# Patient Record
(demographics unavailable — no encounter records)

---

## 2024-12-26 NOTE — HISTORY OF PRESENT ILLNESS
[de-identified] : Mr. Deny Dasilva is a 68 year old male here today for evaluation and management of Prostate Cancer.  \par  \par  He was referred by [facility], Dr. Lr.  Deny is a 68 year old M with PMHx including HTN, who presents to clinic to establish care, accompanied by daughter at initial visit.   Patient underwent TRUS guided prostate biopsy on 2/1/2023 which revealed adenocarcinoma of prostate.  He underwent PET/CT PSMA which reveals numerous foci of abnormal accumulation within the osseous structures of head and neck, thorax, abdomen and pelvis with numerous para-aortic and bilateral iliac lymph nodes, multiple osseous lesions in the left acetabulum, pubic rami T3 and 8 vertebral body.  [place] [facility] [place] and recommended to continue with ADT + Xtandi or Zytiga and repeat imaging in 6 months.  He was started on casodex and ADT (Eliguard every 3 months) which was initiated in 03/2023.  Subsequently, he was started on Xtandi 160mg daily and tolerating fine with some fatigue.  He is presently feeling well.  Patient denies fever, chills, nausea, vomiting, dyspnea, new bony pain or bleeding.  He endorses weight loss of about 30lbs in the last year or so.  Patient reports no known family history of malignancy.\par  \par  RADIOLOGIC WORKUP\par  PET/CT PSMA (3.3.2023) IMPRESSION:Numerous foci of abnormal 18F PSMA accumulation within osseous structures (listed above), mediastinum, abdominal lymph nodes, retrocrural lymph node, prostate gland and left lower neck, consistent with metastatic disease.\par  NM Bone Imaging (1.20.2023) Impression:Abnormal bone agent uptake in the region of the right and left posterior acetabulum seen on planar and SPECT imaging which in correlation with CT abdomen and pelvis 12/28/2022 suspicious for metastatic bone disease.Additional focus of abnormal bone agent uptake in the right anterior second rib which is indeterminant. Correlation with CT chest may be obtained\par  CT A/P (12.28.2022) IMPRESSION:A few scattered sclerotic lesions in the pelvis, for example 7 mm sclerotic lesion in the right ischial tuberosity with lucent center  (8/303), 1.1 cm targetoid lesion in the right iliac bone (8/204) and 1.1 cm sclerotic lesion in the posterior left acetabulum (8/260). These lesions are nonspecific but worrisome for metastatic bone disease if a prostate carcinoma diagnosis is made. Recommend further evaluation with bone scan.No additional evidence for metastatic disease is identified.\par  MR Prostate (12.1.2022-R) IMPRESSION:  1.  Diffuse prostate cancer throughout the entire peripheral zone with involvement of the left transition zone at the apex in the right transition zone at the base.  Mild bulging of the anatomic capsule of the right peripheral zone at the level of the mid gland suspicious for extracapsular extension.  Key images available.  PI-RADS category 5 very high (clinically significant cancer is highly likely to be present). 2.  Suspicious lesion in the right posterior acetabulum.  Recommend correlation with bone scan.3.  No seminal vesicle invasion or pelvic lymphadenopathy.\par  \par  LAB WORKUP \par  (4.20.2023) PSA 5.95 \par  (11.9.2022) PSA 30.70\par  (7.28.2022) PSA 24.42\par  \par  PATHOLOGY\par  Final Diagnosis (2.1.2023) Prostatic Adenocarcinoma.  Dylon's Grade 4+3 = 7/10 (see scanned files) \par  \par  HCM\par  Colonoscopy never done [de-identified] : 8/11/23 Patient is here for a follow-up visit for Prostate Cancer.  He is feeling well with no new complaints.  He continues on Xtandi 140mg daily.  His last Eliguard was administered last week with URO.  Reviewed most recent CBC, which is stable with hgb 14.2g/dL.  PSA level came down to 0.31ng/mL.  He has CT Chest pending since he is a current, every day smoker.  Encouraged to completely abstain from smoking.  We offered smoking cessation  referral, but patient deferred at this time.  Patient aware to let us know when he is ready to quit and we will be glad to assist. He endorses occasional hot flashes.    11/3/23 Patient is here for a follow-up visit for Prostate Cancer.  He is feeling well with no new complaints.  He continues on Xtandi 140mg daily.  His last Eliguard was administered last week with URO.  Reviewed most recent CBC, which is stable with hgb 14g/dL.  PSA level came down to 0.15ng/mL.  He says he had CT Chest performed recently since he is a current, every day smoker.  He does not have the results for our review.  Encouraged to completely abstain from smoking.    1/26/24 Patient is here for a follow-up visit for Prostate Cancer.  He is feeling well with no new complaints.  He continues on Xtandi 140mg daily.  His last Eliguard was administered last week with URO.  Reviewed most recent CBC, which is stable with hgb 13.8g/dL.  PSA level came down to 0.08ng/mL.  Encouraged to completely abstain from smoking.    7/11/24 Patient is here for a follow-up visit for Prostate Cancer.  He is feeling well with no new complaints.  He continues on Xtandi 140mg daily.  His last Eliguard was administered last week with URO.  Reviewed most recent CBC, which is stable with hgb 13.3g/dL.  PSA level came down to 0.06ng/mL.  Encouraged to completely abstain from smoking.    10/3/24 Patient is here for a follow-up visit for Prostate Cancer.  He is feeling well with no new complaints.  He continues on Xtandi 140mg daily.  His is due for next Eliguard with URO in 2 weeks.  Reviewed most recent CBC, which is stable with hgb 14.5g/dL.  PSA level down to 0.05ng/mL.  Encouraged to completely abstain from smoking.  His potassium is high, but he says he has been having a lot of bananas recently.  He endorses occasional hot flashes and some fatigue.  He denies fevers, chills, chest pain, palpitations, nausea, vomiting, bony pain or hematuria.  He continues to smoke.   12/26/24

## 2024-12-26 NOTE — HISTORY OF PRESENT ILLNESS
[de-identified] : Mr. Deny Dasilva is a 68 year old male here today for evaluation and management of Prostate Cancer.  \par  \par  He was referred by [facility], Dr. Lr.  Deny is a 68 year old M with PMHx including HTN, who presents to clinic to establish care, accompanied by daughter at initial visit.   Patient underwent TRUS guided prostate biopsy on 2/1/2023 which revealed adenocarcinoma of prostate.  He underwent PET/CT PSMA which reveals numerous foci of abnormal accumulation within the osseous structures of head and neck, thorax, abdomen and pelvis with numerous para-aortic and bilateral iliac lymph nodes, multiple osseous lesions in the left acetabulum, pubic rami T3 and 8 vertebral body.  Patient was [place] [facility] [place]. Youssef) and recommended to continue with ADT + Xtandi or Zytiga and repeat imaging in 6 months.  He was started on casodex and ADT (Eliguard every 3 months) which was initiated in 03/2023.  Subsequently, he was started on Xtandi 160mg daily and tolerating fine with some fatigue.  He is presently feeling well.  Patient denies fever, chills, nausea, vomiting, dyspnea, new bony pain or bleeding.  He endorses weight loss of about 30lbs in the last year or so.  Patient reports no known family history of malignancy.\par  \par  RADIOLOGIC WORKUP\par  PET/CT PSMA (3.3.2023) IMPRESSION:Numerous foci of abnormal 18F PSMA accumulation within osseous structures (listed above), mediastinum, abdominal lymph nodes, retrocrural lymph node, prostate gland and left lower neck, consistent with metastatic disease.\par  NM Bone Imaging (1.20.2023) Impression:Abnormal bone agent uptake in the region of the right and left posterior acetabulum seen on planar and SPECT imaging which in correlation with CT abdomen and pelvis 12/28/2022 suspicious for metastatic bone disease.Additional focus of abnormal bone agent uptake in the right anterior second rib which is indeterminant. Correlation with CT chest may be obtained\par  CT A/P (12.28.2022) IMPRESSION:A few scattered sclerotic lesions in the pelvis, for example 7 mm sclerotic lesion in the right ischial tuberosity with lucent center  (8/303), 1.1 cm targetoid lesion in the right iliac bone (8/204) and 1.1 cm sclerotic lesion in the posterior left acetabulum (8/260). These lesions are nonspecific but worrisome for metastatic bone disease if a prostate carcinoma diagnosis is made. Recommend further evaluation with bone scan.No additional evidence for metastatic disease is identified.\par  MR Prostate (12.1.2022-R) IMPRESSION:  1.  Diffuse prostate cancer throughout the entire peripheral zone with involvement of the left transition zone at the apex in the right transition zone at the base.  Mild bulging of the anatomic capsule of the right peripheral zone at the level of the mid gland suspicious for extracapsular extension.  Key images available.  PI-RADS category 5 very high (clinically significant cancer is highly likely to be present). 2.  Suspicious lesion in the right posterior acetabulum.  Recommend correlation with bone scan.3.  No seminal vesicle invasion or pelvic lymphadenopathy.\par  \par  LAB WORKUP \par  (4.20.2023) PSA 5.95 \par  (11.9.2022) PSA 30.70\par  (7.28.2022) PSA 24.42\par  \par  PATHOLOGY\par  Final Diagnosis (2.1.2023) Prostatic Adenocarcinoma.  Dylon's Grade 4+3 = 7/10 (see scanned files) \par  \par  HCM\par  Colonoscopy never done [de-identified] : 8/11/23 Patient is here for a follow-up visit for Prostate Cancer.  He is feeling well with no new complaints.  He continues on Xtandi 140mg daily.  His last Eliguard was administered last week with URO.  Reviewed most recent CBC, which is stable with hgb 14.2g/dL.  PSA level came down to 0.31ng/mL.  He has CT Chest pending since he is a current, every day smoker.  Encouraged to completely abstain from smoking.  We offered smoking cessation  referral, but patient deferred at this time.  Patient aware to let us know when he is ready to quit and we will be glad to assist. He endorses occasional hot flashes.    11/3/23 Patient is here for a follow-up visit for Prostate Cancer.  He is feeling well with no new complaints.  He continues on Xtandi 140mg daily.  His last Eliguard was administered last week with URO.  Reviewed most recent CBC, which is stable with hgb 14g/dL.  PSA level came down to 0.15ng/mL.  He says he had CT Chest performed recently since he is a current, every day smoker.  He does not have the results for our review.  Encouraged to completely abstain from smoking.    1/26/24 Patient is here for a follow-up visit for Prostate Cancer.  He is feeling well with no new complaints.  He continues on Xtandi 140mg daily.  His last Eliguard was administered last week with URO.  Reviewed most recent CBC, which is stable with hgb 13.8g/dL.  PSA level came down to 0.08ng/mL.  Encouraged to completely abstain from smoking.    7/11/24 Patient is here for a follow-up visit for Prostate Cancer.  He is feeling well with no new complaints.  He continues on Xtandi 140mg daily.  His last Eliguard was administered last week with URO.  Reviewed most recent CBC, which is stable with hgb 13.3g/dL.  PSA level came down to 0.06ng/mL.  Encouraged to completely abstain from smoking.    10/3/24 Patient is here for a follow-up visit for Prostate Cancer.  He is feeling well with no new complaints.  He continues on Xtandi 140mg daily.  His is due for next Eliguard with URO in 2 weeks.  Reviewed most recent CBC, which is stable with hgb 14.5g/dL.  PSA level down to 0.05ng/mL.  Encouraged to completely abstain from smoking.  His potassium is high, but he says he has been having a lot of bananas recently.  He endorses occasional hot flashes and some fatigue.  He denies fevers, chills, chest pain, palpitations, nausea, vomiting, bony pain or hematuria.  He continues to smoke.   12/26/24

## 2024-12-26 NOTE — ASSESSMENT
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 Lr, as scheduled   # Hyperkalemia - asymptomatic  - likely due to dietary consumption (consuming bananas daily); he will adjust diet and drink more water - Labwork today: K+ STAT ; if persistently high, plan to send [facility].    RTC in 3 months with  CBC, BMP, LFTs, PSA 2 days prior

## 2024-12-26 NOTE — CONSULT LETTER
[Dear  ___] : Dear  [unfilled], [Consult Letter:] : I had the pleasure of evaluating your patient, [unfilled]. [Please see my note below.] : Please see my note below. [Sincerely,] : Sincerely, [FreeTextEntry3] : Stephen Vital DO Attending Physician, Hematology/ Medical Oncology [phone] office

## 2024-12-26 NOTE — REVIEW OF SYSTEMS
[Fatigue] : fatigue [Hot Flashes] : hot flashes [Negative] : Allergic/Immunologic [Recent Change In Weight] : ~T no recent weight change [Chest Pain] : no chest pain [Palpitations] : no palpitations [FreeTextEntry8] : reports slow urination [FreeTextEntry9] : chronic shoulder pain

## 2024-12-26 NOTE — ASSESSMENT
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 Lr, as scheduled   # Hyperkalemia - asymptomatic  - likely due to dietary consumption (consuming bananas daily); he will adjust diet and drink more water - Labwork today: K+ STAT ; if persistently high, plan to send [facility].    RTC in 3 months with  CBC, BMP, LFTs, PSA 2 days prior

## 2024-12-26 NOTE — CONSULT LETTER
[Dear  ___] : Dear  [unfilled], [Consult Letter:] : I had the pleasure of evaluating your patient, [unfilled]. [Please see my note below.] : Please see my note below. [Sincerely,] : Sincerely, [FreeTextEntry3] : Stephen Vital DO Attending Physician, Hematology/ Medical Oncology [phone] office

## 2025-03-20 NOTE — HISTORY OF PRESENT ILLNESS
[de-identified] : Mr. Deny Dasilva is a 68 year old male here today for evaluation and management of Prostate Cancer.  \par  \par  He was referred by U[facility], Dr. Lr.  Deny is a 68 year old M with PMHx including HTN, who presents to clinic to establish care, accompanied by daughter at initial visit.   Patient underwent TRUS guided prostate biopsy on 2/1/2023 which revealed adenocarcinoma of prostate.  He underwent PET/CT PSMA which reveals numerous foci of abnormal accumulation within the osseous structures of head and neck, thorax, abdomen and pelvis with numerous para-aortic and bilateral iliac lymph nodes, multiple osseous lesions in the left acetabulum, pubic rami T3 and 8 vertebral body.  Patient was [place] at [facility] [place] ([place]. Youssef) and recommended to continue with ADT + Xtandi or Zytiga and repeat imaging in 6 months.  He was started on casodex and ADT (Eliguard every 3 months) which was initiated in 03/2023.  Subsequently, he was started on Xtandi 160mg daily and tolerating fine with some fatigue.  He is presently feeling well.  Patient denies fever, chills, nausea, vomiting, dyspnea, new bony pain or bleeding.  He endorses weight loss of about 30lbs in the last year or so.  Patient reports no known family history of malignancy.\par  \par  RADIOLOGIC WORKUP\par  PET/CT PSMA (3.3.2023) IMPRESSION:Numerous foci of abnormal 18F PSMA accumulation within osseous structures (listed above), mediastinum, abdominal lymph nodes, retrocrural lymph node, prostate gland and left lower neck, consistent with metastatic disease.\par  NM Bone Imaging (1.20.2023) Impression:Abnormal bone agent uptake in the region of the right and left posterior acetabulum seen on planar and SPECT imaging which in correlation with CT abdomen and pelvis 12/28/2022 suspicious for metastatic bone disease.Additional focus of abnormal bone agent uptake in the right anterior second rib which is indeterminant. Correlation with CT chest may be obtained\par  CT A/P (12.28.2022) IMPRESSION:A few scattered sclerotic lesions in the pelvis, for example 7 mm sclerotic lesion in the right ischial tuberosity with lucent center  (8/303), 1.1 cm targetoid lesion in the right iliac bone (8/204) and 1.1 cm sclerotic lesion in the posterior left acetabulum (8/260). These lesions are nonspecific but worrisome for metastatic bone disease if a prostate carcinoma diagnosis is made. Recommend further evaluation with bone scan.No additional evidence for metastatic disease is identified.\par  MR Prostate (12.1.2022-R) IMPRESSION:  1.  Diffuse prostate cancer throughout the entire peripheral zone with involvement of the left transition zone at the apex in the right transition zone at the base.  Mild bulging of the anatomic capsule of the right peripheral zone at the level of the mid gland suspicious for extracapsular extension.  Key images available.  PI-RADS category 5 very high (clinically significant cancer is highly likely to be present). 2.  Suspicious lesion in the right posterior acetabulum.  Recommend correlation with bone scan.3.  No seminal vesicle invasion or pelvic lymphadenopathy.\par  \par  LAB WORKUP \par  (4.20.2023) PSA 5.95 \par  (11.9.2022) PSA 30.70\par  (7.28.2022) PSA 24.42\par  \par  PATHOLOGY\par  Final Diagnosis (2.1.2023) Prostatic Adenocarcinoma.  Dylon's Grade 4+3 = 7/10 (see scanned files) \par  \par  HCM\par  Colonoscopy never done [de-identified] : 8/11/23 Patient is here for a follow-up visit for Prostate Cancer.  He is feeling well with no new complaints.  He continues on Xtandi 140mg daily.  His last Eliguard was administered last week with URO.  Reviewed most recent CBC, which is stable with hgb 14.2g/dL.  PSA level came down to 0.31ng/mL.  He has CT Chest pending since he is a current, every day smoker.  Encouraged to completely abstain from smoking.  We offered smoking cessation  referral, but patient deferred at this time.  Patient aware to let us know when he is ready to quit and we will be glad to assist. He endorses occasional hot flashes.    11/3/23 Patient is here for a follow-up visit for Prostate Cancer.  He is feeling well with no new complaints.  He continues on Xtandi 140mg daily.  His last Eliguard was administered last week with URO.  Reviewed most recent CBC, which is stable with hgb 14g/dL.  PSA level came down to 0.15ng/mL.  He says he had CT Chest performed recently since he is a current, every day smoker.  He does not have the results for our review.  Encouraged to completely abstain from smoking.    1/26/24 Patient is here for a follow-up visit for Prostate Cancer.  He is feeling well with no new complaints.  He continues on Xtandi 140mg daily.  His last Eliguard was administered last week with URO.  Reviewed most recent CBC, which is stable with hgb 13.8g/dL.  PSA level came down to 0.08ng/mL.  Encouraged to completely abstain from smoking.    7/11/24 Patient is here for a follow-up visit for Prostate Cancer.  He is feeling well with no new complaints.  He continues on Xtandi 140mg daily.  His last Eliguard was administered last week with URO.  Reviewed most recent CBC, which is stable with hgb 13.3g/dL.  PSA level came down to 0.06ng/mL.  Encouraged to completely abstain from smoking.    10/3/24 Patient is here for a follow-up visit for Prostate Cancer.  He is feeling well with no new complaints.  He continues on Xtandi 140mg daily.  His is due for next Eliguard with URO in 2 weeks.  Reviewed most recent CBC, which is stable with hgb 14.5g/dL.  PSA level down to 0.05ng/mL.  Encouraged to completely abstain from smoking.  His potassium is high, but he says he has been having a lot of bananas recently.  He endorses occasional hot flashes and some fatigue.  He denies fevers, chills, chest pain, palpitations, nausea, vomiting, bony pain or hematuria.  He continues to smoke.   3/20/25 Patient is here for a follow-up visit for Prostate Cancer.  He continues on Xtandi 140mg daily.  His is due for next Eliguard with URO in 2 weeks.  He was in the hospital at [facility] a few months ago due to upper respiratory infection but feeling better.  Reviewed most recent CBC, which is stable with hgb 13.5g/dL.  PSA level down to 0.05ng/mL.  He denies fevers, chills, chest pain, palpitations, nausea, vomiting, bony pain or hematuria.

## 2025-03-20 NOTE — HISTORY OF PRESENT ILLNESS
[de-identified] : Mr. Deny Dasilva is a 68 year old male here today for evaluation and management of Prostate Cancer.  \par  \par  He was referred by URO, Dr. rL.  Deny is a 68 year old M with PMHx including HTN, who presents to clinic to establish care, accompanied by daughter at initial visit.   Patient underwent TRUS guided prostate biopsy on 2/1/2023 which revealed adenocarcinoma of prostate.  He underwent PET/CT PSMA which reveals numerous foci of abnormal accumulation within the osseous structures of head and neck, thorax, abdomen and pelvis with numerous para-aortic and bilateral iliac lymph nodes, multiple osseous lesions in the left acetabulum, pubic rami T3 and 8 vertebral body.  Patient was evaluated at [facility] (Dr. Youssef) and recommended to continue with ADT + Xtandi or Zytiga and repeat imaging in 6 months.  He was started on casodex and ADT (Eliguard every 3 months) which was initiated in 03/2023.  Subsequently, he was started on Xtandi 160mg daily and tolerating fine with some fatigue.  He is presently feeling well.  Patient denies fever, chills, nausea, vomiting, dyspnea, new bony pain or bleeding.  He endorses weight loss of about 30lbs in the last year or so.  Patient reports no known family history of malignancy.\par  \par  RADIOLOGIC WORKUP\par  PET/CT PSMA (3.3.2023) IMPRESSION:Numerous foci of abnormal 18F PSMA accumulation within osseous structures (listed above), mediastinum, abdominal lymph nodes, retrocrural lymph node, prostate gland and left lower neck, consistent with metastatic disease.\par  NM Bone Imaging (1.20.2023) Impression:Abnormal bone agent uptake in the region of the right and left posterior acetabulum seen on planar and SPECT imaging which in correlation with CT abdomen and pelvis 12/28/2022 suspicious for metastatic bone disease.Additional focus of abnormal bone agent uptake in the right anterior second rib which is indeterminant. Correlation with CT chest may be obtained\par  CT A/P (12.28.2022) IMPRESSION:A few scattered sclerotic lesions in the pelvis, for example 7 mm sclerotic lesion in the right ischial tuberosity with lucent center  (8/303), 1.1 cm targetoid lesion in the right iliac bone (8/204) and 1.1 cm sclerotic lesion in the posterior left acetabulum (8/260). These lesions are nonspecific but worrisome for metastatic bone disease if a prostate carcinoma diagnosis is made. Recommend further evaluation with bone scan.No additional evidence for metastatic disease is identified.\par  MR Prostate (12.1.2022-R) IMPRESSION:  1.  Diffuse prostate cancer throughout the entire peripheral zone with involvement of the left transition zone at the apex in the right transition zone at the base.  Mild bulging of the anatomic capsule of the right peripheral zone at the level of the mid gland suspicious for extracapsular extension.  Key images available.  PI-RADS category 5 very high (clinically significant cancer is highly likely to be present). 2.  Suspicious lesion in the right posterior acetabulum.  Recommend correlation with bone scan.3.  No seminal vesicle invasion or pelvic lymphadenopathy.\par  \par  LAB WORKUP \par  (4.20.2023) PSA 5.95 \par  (11.9.2022) PSA 30.70\par  (7.28.2022) PSA 24.42\par  \par  PATHOLOGY\par  Final Diagnosis (2.1.2023) Prostatic Adenocarcinoma.  Dylon's Grade 4+3 = 7/10 (see scanned files) \par  \par  HCM\par  Colonoscopy never done [de-identified] : 8/11/23 Patient is here for a follow-up visit for Prostate Cancer.  He is feeling well with no new complaints.  He continues on Xtandi 140mg daily.  His last Eliguard was administered last week with URO.  Reviewed most recent CBC, which is stable with hgb 14.2g/dL.  PSA level came down to 0.31ng/mL.  He has CT Chest pending since he is a current, every day smoker.  Encouraged to completely abstain from smoking.  We offered smoking cessation  referral, but patient deferred at this time.  Patient aware to let us know when he is ready to quit and we will be glad to assist. He endorses occasional hot flashes.    11/3/23 Patient is here for a follow-up visit for Prostate Cancer.  He is feeling well with no new complaints.  He continues on Xtandi 140mg daily.  His last Eliguard was administered last week with URO.  Reviewed most recent CBC, which is stable with hgb 14g/dL.  PSA level came down to 0.15ng/mL.  He says he had CT Chest performed recently since he is a current, every day smoker.  He does not have the results for our review.  Encouraged to completely abstain from smoking.    1/26/24 Patient is here for a follow-up visit for Prostate Cancer.  He is feeling well with no new complaints.  He continues on Xtandi 140mg daily.  His last Eliguard was administered last week with URO.  Reviewed most recent CBC, which is stable with hgb 13.8g/dL.  PSA level came down to 0.08ng/mL.  Encouraged to completely abstain from smoking.    7/11/24 Patient is here for a follow-up visit for Prostate Cancer.  He is feeling well with no new complaints.  He continues on Xtandi 140mg daily.  His last Eliguard was administered last week with URO.  Reviewed most recent CBC, which is stable with hgb 13.3g/dL.  PSA level came down to 0.06ng/mL.  Encouraged to completely abstain from smoking.    10/3/24 Patient is here for a follow-up visit for Prostate Cancer.  He is feeling well with no new complaints.  He continues on Xtandi 140mg daily.  His is due for next Eliguard with URO in 2 weeks.  Reviewed most recent CBC, which is stable with hgb 14.5g/dL.  PSA level down to 0.05ng/mL.  Encouraged to completely abstain from smoking.  His potassium is high, but he says he has been having a lot of bananas recently.  He endorses occasional hot flashes and some fatigue.  He denies fevers, chills, chest pain, palpitations, nausea, vomiting, bony pain or hematuria.  He continues to smoke.   3/20/25 Patient is here for a follow-up visit for Prostate Cancer.  He continues on Xtandi 140mg daily.  His is due for next Eliguard with URO in 2 weeks.  He was in the hospital at [facility] a few months ago due to upper respiratory infection but feeling better.  Reviewed most recent CBC, which is stable with hgb 13.5g/dL.  PSA level down to 0.05ng/mL.  He denies fevers, chills, chest pain, palpitations, nausea, vomiting, bony pain or hematuria.

## 2025-03-20 NOTE — CONSULT LETTER
[Dear  ___] : Dear  [unfilled], [Consult Letter:] : I had the pleasure of evaluating your patient, [unfilled]. [Please see my note below.] : Please see my note below. [Sincerely,] : Sincerely, [FreeTextEntry3] : Stephen Vital DO Attending Physician, Hematology/ Medical Oncology [phone] office

## 2025-03-20 NOTE — ASSESSMENT
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, [place] 4+3 = 7 , dx 02/2023 - s/p TRUSP on 2.1.2023 - reviewed radiology, pathology and lab workup and had a discussion regarding implications of diagnosis, prognosis and options for management including but not limited to ADT + Xtandi  - STOP Casodex 50mg daily as of 03/2025  - c/w Xtandi 160mg daily as prescribed , Rx renewed  - c/w Eliguard every 3 months with URO, initiated 03/2023; last given 01/2025, next due 04/2025  - c/w Zometa 4mg IV every 12 weeks; due today 3/20, next due 06/2025 ; provided dental clearance form to be filled out by dentist annually as per clinic protocol  - followup with URO, Dr. Lr, as scheduled   # Hyperkalemia - asymptomatic  - likely due to dietary consumption (consuming bananas daily) - resolved  RTC in 3 months with CBC, BMP, LFTs, PSA 2 days prior  Seen/ examined /w VESTA Atwood; note reviewed; case discussed; agree w/ plan;  continue ADT (stop casodex though continue xtandi continue zometa

## 2025-03-20 NOTE — CONSULT LETTER
[Dear  ___] : Dear  [unfilled], [Consult Letter:] : I had the pleasure of evaluating your patient, [unfilled]. [Please see my note below.] : Please see my note below. [Sincerely,] : Sincerely, [FreeTextEntry3] : Stephen Vital DO Attending Physician, Hematology/ Medical Oncology [phone] office

## 2025-03-20 NOTE — ASSESSMENT
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, [place] 4+3 = 7 , dx 02/2023 - s/p TRUSP on 2.1.2023 - reviewed radiology, pathology and lab workup and had a discussion regarding implications of diagnosis, prognosis and options for management including but not limited to ADT + Xtandi  - STOP Casodex 50mg daily as of 03/2025  - c/w Xtandi 160mg daily as prescribed , Rx renewed  - c/w Eliguard every 3 months with URO, initiated 03/2023; last given 01/2025, next due 04/2025  - c/w Zometa 4mg IV every 12 weeks; due today 3/20, next due 06/2025 ; provided dental clearance form to be filled out by dentist annually as per clinic protocol  - followup with URO, Dr. Lr, as scheduled   # Hyperkalemia - asymptomatic  - likely due to dietary consumption (consuming bananas daily) - resolved  RTC in 3 months with CBC, BMP, LFTs, PSA 2 days prior  Seen/ examined /w VESTA Atwood; note reviewed; case discussed; agree w/ plan;  continue ADT (stop casodex though continue xtandi continue zometa

## 2025-06-12 NOTE — CONSULT LETTER
[Dear  ___] : Dear  [unfilled], [Consult Letter:] : I had the pleasure of evaluating your patient, [unfilled]. [Sincerely,] : Sincerely, [Please see my note below.] : Please see my note below. [FreeTextEntry3] : Stephen Jane NP  Hematology/ Medical Oncology [phone] office

## 2025-06-12 NOTE — HISTORY OF PRESENT ILLNESS
[de-identified] : Mr. Deny Dasilva is a 68 year old male here today for evaluation and management of Prostate Cancer.  \par  \par  He was referred by URO, Dr. Lr.  Deny is a 68 year old M with PMHx including HTN, who presents to clinic to establish care, accompanied by daughter at initial visit.   Patient underwent TRUS guided prostate biopsy on 2/1/2023 which revealed adenocarcinoma of prostate.  He underwent PET/CT PSMA which reveals numerous foci of abnormal accumulation within the osseous structures of head and neck, thorax, abdomen and pelvis with numerous para-aortic and bilateral iliac lymph nodes, multiple osseous lesions in the left acetabulum, pubic rami T3 and 8 vertebral body.  Patient was evaluated at [facility] (Dr. Youssef) and recommended to continue with ADT + Xtandi or Zytiga and repeat imaging in 6 months.  He was started on casodex and ADT (Eliguard every 3 months) which was initiated in 03/2023.  Subsequently, he was started on Xtandi 160mg daily and tolerating fine with some fatigue.  He is presently feeling well.  Patient denies fever, chills, nausea, vomiting, dyspnea, new bony pain or bleeding.  He endorses weight loss of about 30lbs in the last year or so.  Patient reports no known family history of malignancy.\par  \par  RADIOLOGIC WORKUP\par  PET/CT PSMA (3.3.2023) IMPRESSION:Numerous foci of abnormal 18F PSMA accumulation within osseous structures (listed above), mediastinum, abdominal lymph nodes, retrocrural lymph node, prostate gland and left lower neck, consistent with metastatic disease.\par  NM Bone Imaging (1.20.2023) Impression:Abnormal bone agent uptake in the region of the right and left posterior acetabulum seen on planar and SPECT imaging which in correlation with CT abdomen and pelvis 12/28/2022 suspicious for metastatic bone disease.Additional focus of abnormal bone agent uptake in the right anterior second rib which is indeterminant. Correlation with CT chest may be obtained\par  CT A/P (12.28.2022) IMPRESSION:A few scattered sclerotic lesions in the pelvis, for example 7 mm sclerotic lesion in the right ischial tuberosity with lucent center  (8/303), 1.1 cm targetoid lesion in the right iliac bone (8/204) and 1.1 cm sclerotic lesion in the posterior left acetabulum (8/260). These lesions are nonspecific but worrisome for metastatic bone disease if a prostate carcinoma diagnosis is made. Recommend further evaluation with bone scan.No additional evidence for metastatic disease is identified.\par  MR Prostate (12.1.2022-R) IMPRESSION:  1.  Diffuse prostate cancer throughout the entire peripheral zone with involvement of the left transition zone at the apex in the right transition zone at the base.  Mild bulging of the anatomic capsule of the right peripheral zone at the level of the mid gland suspicious for extracapsular extension.  Key images available.  PI-RADS category 5 very high (clinically significant cancer is highly likely to be present). 2.  Suspicious lesion in the right posterior acetabulum.  Recommend correlation with bone scan.3.  No seminal vesicle invasion or pelvic lymphadenopathy.\par  \par  LAB WORKUP \par  (4.20.2023) PSA 5.95 \par  (11.9.2022) PSA 30.70\par  (7.28.2022) PSA 24.42\par  \par  PATHOLOGY\par  Final Diagnosis (2.1.2023) Prostatic Adenocarcinoma.  Dylon's Grade 4+3 = 7/10 (see scanned files) \par  \par  HCM\par  Colonoscopy never done [de-identified] : 8/11/23 Patient is here for a follow-up visit for Prostate Cancer.  He is feeling well with no new complaints.  He continues on Xtandi 140mg daily.  His last Eliguard was administered last week with URO.  Reviewed most recent CBC, which is stable with hgb 14.2g/dL.  PSA level came down to 0.31ng/mL.  He has CT Chest pending since he is a current, every day smoker.  Encouraged to completely abstain from smoking.  We offered smoking cessation  referral, but patient deferred at this time.  Patient aware to let us know when he is ready to quit and we will be glad to assist. He endorses occasional hot flashes.    11/3/23 Patient is here for a follow-up visit for Prostate Cancer.  He is feeling well with no new complaints.  He continues on Xtandi 140mg daily.  His last Eliguard was administered last week with URO.  Reviewed most recent CBC, which is stable with hgb 14g/dL.  PSA level came down to 0.15ng/mL.  He says he had CT Chest performed recently since he is a current, every day smoker.  He does not have the results for our review.  Encouraged to completely abstain from smoking.    1/26/24 Patient is here for a follow-up visit for Prostate Cancer.  He is feeling well with no new complaints.  He continues on Xtandi 140mg daily.  His last Eliguard was administered last week with URO.  Reviewed most recent CBC, which is stable with hgb 13.8g/dL.  PSA level came down to 0.08ng/mL.  Encouraged to completely abstain from smoking.    7/11/24 Patient is here for a follow-up visit for Prostate Cancer.  He is feeling well with no new complaints.  He continues on Xtandi 140mg daily.  His last Eliguard was administered last week with URO.  Reviewed most recent CBC, which is stable with hgb 13.3g/dL.  PSA level came down to 0.06ng/mL.  Encouraged to completely abstain from smoking.    10/3/24 Patient is here for a follow-up visit for Prostate Cancer.  He is feeling well with no new complaints.  He continues on Xtandi 140mg daily.  His is due for next Eliguard with URO in 2 weeks.  Reviewed most recent CBC, which is stable with hgb 14.5g/dL.  PSA level down to 0.05ng/mL.  Encouraged to completely abstain from smoking.  His potassium is high, but he says he has been having a lot of bananas recently.  He endorses occasional hot flashes and some fatigue.  He denies fevers, chills, chest pain, palpitations, nausea, vomiting, bony pain or hematuria.  He continues to smoke.   3/20/25 Patient is here for a follow-up visit for Prostate Cancer.  He continues on Xtandi 140mg daily.  His is due for next Eliguard with URO in 2 weeks.  He was in the hospital at [facility] a few months ago due to upper respiratory infection but feeling better.  Reviewed most recent CBC, which is stable with hgb 13.5g/dL.  PSA level down to 0.05ng/mL.  He denies fevers, chills, chest pain, palpitations, nausea, vomiting, bony pain or hematuria.    6/12/25 Patient is here for a follow-up visit for Prostate Cancer.  He continues on Xtandi 140mg daily.  He last received Eliguard with URO back in 04/2025; however, he states he would like to transition to ADT here due to convenience.  He will be due for Lupron starting 07/2025.  Patient reports urinary frequency which is not new but is bothersome; states he urinates only a few drops each time but does not feel like he has urinary retention.  He has not discussed this symptom with URO as he has not been established with another Urologist since Dr. Lr's departure.  Reviewed most recent CBC, which is stable with hgb 15g/dL.  PSA level is at 0.08ng/mL.  He denies fevers, chills, chest pain, palpitations, nausea, vomiting, bony pain or hematuria.  However, he notes dyspnea with minimal exertion, which is also not new.  He states he is a current every day smoker, but trying to cut down.  He does not have an established pulmonologist despite smoking 55PY.

## 2025-06-12 NOTE — ASSESSMENT
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 clinic every 3 months starting 07/2025, initiated ADT using Eligard with URO in 03/2023-04/2025 ; possible side effects discussed + orders written  - c/w Zometa 4mg IV every 12 weeks; due today 6/12, next due 09/2025  - followup with URO, Dr. Lr, as scheduled   # Hyperkalemia - asymptomatic  - likely due to dietary consumption (consuming bananas daily) - resolved  # Chronic cough, current everyday smoker  - Encouraged to completely abstain from smoking.  We offered smoking cessation  referral, but patient deferred at this time.  Patient aware to let us know when he is ready to quit and we will be glad to assist. - Refer to PULM for chronic productive cough, dyspnea with minimal exertion in a current everyday smoker with 55PY history ; tasked SI Care Team for assistance with scheduling + Rx given  # Urinary frequency x 1+ year  - offered urine C&S; patient only able to urinates droplets each time.   - Refer to URO due to LUTs including frequency (no burning, foul odor, change in color) to r/o urinary retention; patient only able to urinates droplets each time; tasked SI Care Team for assistance with scheduling + Rx given  RTC in 3 months with CBC, BMP, LFTs, PSA 2 days prior

## 2025-06-12 NOTE — HISTORY OF PRESENT ILLNESS
[de-identified] : Mr. Deny Dasilva is a 68 year old male here today for evaluation and management of Prostate Cancer.  \par  \par  He was referred by URO, Dr. Lr.  Deny is a 68 year old M with PMHx including HTN, who presents to clinic to establish care, accompanied by daughter at initial visit.   Patient underwent TRUS guided prostate biopsy on 2/1/2023 which revealed adenocarcinoma of prostate.  He underwent PET/CT PSMA which reveals numerous foci of abnormal accumulation within the osseous structures of head and neck, thorax, abdomen and pelvis with numerous para-aortic and bilateral iliac lymph nodes, multiple osseous lesions in the left acetabulum, pubic rami T3 and 8 vertebral body.  [place] [facility] [place] and recommended to continue with ADT + Xtandi or Zytiga and repeat imaging in 6 months.  He was started on casodex and ADT (Eliguard every 3 months) which was initiated in 03/2023.  Subsequently, he was started on Xtandi 160mg daily and tolerating fine with some fatigue.  He is presently feeling well.  Patient denies fever, chills, nausea, vomiting, dyspnea, new bony pain or bleeding.  He endorses weight loss of about 30lbs in the last year or so.  Patient reports no known family history of malignancy.\par  \par  RADIOLOGIC WORKUP\par  PET/CT PSMA (3.3.2023) IMPRESSION:Numerous foci of abnormal 18F PSMA accumulation within osseous structures (listed above), mediastinum, abdominal lymph nodes, retrocrural lymph node, prostate gland and left lower neck, consistent with metastatic disease.\par  NM Bone Imaging (1.20.2023) Impression:Abnormal bone agent uptake in the region of the right and left posterior acetabulum seen on planar and SPECT imaging which in correlation with CT abdomen and pelvis 12/28/2022 suspicious for metastatic bone disease.Additional focus of abnormal bone agent uptake in the right anterior second rib which is indeterminant. Correlation with CT chest may be obtained\par  CT A/P (12.28.2022) IMPRESSION:A few scattered sclerotic lesions in the pelvis, for example 7 mm sclerotic lesion in the right ischial tuberosity with lucent center  (8/303), 1.1 cm targetoid lesion in the right iliac bone (8/204) and 1.1 cm sclerotic lesion in the posterior left acetabulum (8/260). These lesions are nonspecific but worrisome for metastatic bone disease if a prostate carcinoma diagnosis is made. Recommend further evaluation with bone scan.No additional evidence for metastatic disease is identified.\par  MR Prostate (12.1.2022-R) IMPRESSION:  1.  Diffuse prostate cancer throughout the entire peripheral zone with involvement of the left transition zone at the apex in the right transition zone at the base.  Mild bulging of the anatomic capsule of the right peripheral zone at the level of the mid gland suspicious for extracapsular extension.  Key images available.  PI-RADS category 5 very high (clinically significant cancer is highly likely to be present). 2.  Suspicious lesion in the right posterior acetabulum.  Recommend correlation with bone scan.3.  No seminal vesicle invasion or pelvic lymphadenopathy.\par  \par  LAB WORKUP \par  (4.20.2023) PSA 5.95 \par  (11.9.2022) PSA 30.70\par  (7.28.2022) PSA 24.42\par  \par  PATHOLOGY\par  Final Diagnosis (2.1.2023) Prostatic Adenocarcinoma.  Dylon's Grade 4+3 = 7/10 (see scanned files) \par  \par  HCM\par  Colonoscopy never done [de-identified] : 8/11/23 Patient is here for a follow-up visit for Prostate Cancer.  He is feeling well with no new complaints.  He continues on Xtandi 140mg daily.  His last Eliguard was administered last week with URO.  Reviewed most recent CBC, which is stable with hgb 14.2g/dL.  PSA level came down to 0.31ng/mL.  He has CT Chest pending since he is a current, every day smoker.  Encouraged to completely abstain from smoking.  We offered smoking cessation  referral, but patient deferred at this time.  Patient aware to let us know when he is ready to quit and we will be glad to assist. He endorses occasional hot flashes.    11/3/23 Patient is here for a follow-up visit for Prostate Cancer.  He is feeling well with no new complaints.  He continues on Xtandi 140mg daily.  His last Eliguard was administered last week with URO.  Reviewed most recent CBC, which is stable with hgb 14g/dL.  PSA level came down to 0.15ng/mL.  He says he had CT Chest performed recently since he is a current, every day smoker.  He does not have the results for our review.  Encouraged to completely abstain from smoking.    1/26/24 Patient is here for a follow-up visit for Prostate Cancer.  He is feeling well with no new complaints.  He continues on Xtandi 140mg daily.  His last Eliguard was administered last week with URO.  Reviewed most recent CBC, which is stable with hgb 13.8g/dL.  PSA level came down to 0.08ng/mL.  Encouraged to completely abstain from smoking.    7/11/24 Patient is here for a follow-up visit for Prostate Cancer.  He is feeling well with no new complaints.  He continues on Xtandi 140mg daily.  His last Eliguard was administered last week with URO.  Reviewed most recent CBC, which is stable with hgb 13.3g/dL.  PSA level came down to 0.06ng/mL.  Encouraged to completely abstain from smoking.    10/3/24 Patient is here for a follow-up visit for Prostate Cancer.  He is feeling well with no new complaints.  He continues on Xtandi 140mg daily.  His is due for next Eliguard with URO in 2 weeks.  Reviewed most recent CBC, which is stable with hgb 14.5g/dL.  PSA level down to 0.05ng/mL.  Encouraged to completely abstain from smoking.  His potassium is high, but he says he has been having a lot of bananas recently.  He endorses occasional hot flashes and some fatigue.  He denies fevers, chills, chest pain, palpitations, nausea, vomiting, bony pain or hematuria.  He continues to smoke.   3/20/25 Patient is here for a follow-up visit for Prostate Cancer.  He continues on Xtandi 140mg daily.  His is due for next Eliguard with URO in 2 weeks.  He was in the hospital at [facility] a few months ago due to upper respiratory infection but feeling better.  Reviewed most recent CBC, which is stable with hgb 13.5g/dL.  PSA level down to 0.05ng/mL.  He denies fevers, chills, chest pain, palpitations, nausea, vomiting, bony pain or hematuria.    6/12/25 Patient is here for a follow-up visit for Prostate Cancer.  He continues on Xtandi 140mg daily.  He last received Eliguard with URO back in 04/2025; however, he states he would like to transition to ADT here due to convenience.  He will be due for Lupron starting 07/2025.  Patient reports urinary frequency which is not new but is bothersome; states he urinates only a few drops each time but does not feel like he has urinary retention.  He has not discussed this symptom with URO as he has not been established with another Urologist since Dr. Lr's departure.  Reviewed most recent CBC, which is stable with hgb 15g/dL.  PSA level is at 0.08ng/mL.  He denies fevers, chills, chest pain, palpitations, nausea, vomiting, bony pain or hematuria.  However, he notes dyspnea with minimal exertion, which is also not new.  He states he is a current every day smoker, but trying to cut down.  He does not have an established pulmonologist despite smoking 55PY.

## 2025-06-12 NOTE — ASSESSMENT
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, [place] 4+3 = 7 , dx 02/2023 - s/p TRUSP on 2.1.2023 - reviewed radiology, pathology and lab workup and had a discussion regarding implications of diagnosis, prognosis and options for management including but not limited to ADT + Xtandi  - STOP Casodex 50mg daily as of 03/2025  - c/w Xtandi 160mg daily as prescribed , Rx renewed  - change from Eliguard (previously given with Urologist) to Lupron in [facility] clinic every 3 months starting 07/2025, initiated ADT using Eligard with URO in 03/2023-04/2025 ; possible side effects discussed + orders written  - c/w Zometa 4mg IV every 12 weeks; due today 6/12, next due 09/2025  - followup with URO, Dr. Lr, as scheduled   # Hyperkalemia - asymptomatic  - likely due to dietary consumption (consuming bananas daily) - resolved  # Chronic cough, current everyday smoker  - Encouraged to completely abstain from smoking.  We offered smoking cessation  referral, but patient deferred at this time.  Patient aware to let us know when he is ready to quit and we will be glad to assist. - Refer to PULM for chronic productive cough, dyspnea with minimal exertion in a current everyday smoker with 55PY history ; tasked SI Care Team for assistance with scheduling + Rx given  # Urinary frequency x 1+ year  - offered urine C&S; patient only able to urinates droplets each time.   - Refer to URO due to LUTs including frequency (no burning, foul odor, change in color) to r/o urinary retention; patient only able to urinates droplets each time; tasked SI Care Team for assistance with scheduling + Rx given  RTC in 3 months with CBC, BMP, LFTs, PSA 2 days prior

## 2025-06-26 NOTE — PHYSICAL EXAM
[Oriented To Time, Place, And Person] : oriented to person, place, and time [FreeTextEntry1] : [place]

## 2025-06-26 NOTE — ASSESSMENT
[FreeTextEntry1] : 71 y/o male - appears older than stated age PMHx including HTN, who presents to clinic to establish care, accompanied by daughter at initial visit. Patient underwent TRUS guided prostate biopsy on 2/1/2023 which revealed adenocarcinoma of prostate. He underwent PET/CT PSMA which reveals numerous foci of abnormal accumulation within the osseous structures of head and neck, thorax, abdomen and pelvis with numerous para-aortic and bilateral iliac lymph nodes, multiple osseous lesions in the left acetabulum, pubic rami T3 and 8 vertebral body. [place] [facility] [place] and recommended to continue with ADT + Xtandi or Zytiga and repeat imaging in 6 months. He was started on casodex and ADT (Eliguard every 3 months) which was initiated in 03/2023. Subsequently, he was started on Xtandi 160mg daily and tolerating fine with some fatigue. He is presently feeling well. Patient denies fever, chills, nausea, vomiting, dyspnea, new bony pain or bleeding.   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, Dylon 4+3 = 7, dx 02/2023 - s/p TRUSP on 2.1.2023 - reviewed radiology, pathology and lab workup and had a discussion regarding implications of diagnosis, prognosis and options for management including but not limited to ADT + Xtandi - STOP Casodex 50mg daily as of 03/2025 - c/w Xtandi 160mg daily as prescribed , Rx renewed - change from Eliguard (previously given with Urologist) to Lupron in [facility] clinic every 3 months starting 07/2025, initiated ADT using Eligard with URO in 03/2023-04/2025 ; possible side effects discussed + orders written - c/w Zometa 4mg IV every 12 weeks; due today 6/12, next due 09/2025  referred for occasional urgency he has been followed and continues to follow with Dr. Lr he is on Myrbetriq - prescribed by Dr. Lr and monitored by him   ml  IPSS 22  Plan 71 yo with bothersome LUTS / metastatic prostate cancer  - continue with myrbetriq - urine obtained for culture - f/u with Dr. Lr  - all questions answered  [Urinary Symptom or Sign (788.99\R39.89)] : implantation

## 2025-06-26 NOTE — REVIEW OF SYSTEMS
[Feeling Poorly] : feeling poorly [Chest Pain] : no chest pain [Palpitations] : no palpitations [Cough] : no cough [SOB on Exertion] : no shortness of breath during exertion [Incontinence] : incontinence [Joint Swelling] : joint swelling [Joint Stiffness] : joint stiffness [Limb Pain] : limb pain

## 2025-06-26 NOTE — HISTORY OF PRESENT ILLNESS
[FreeTextEntry1] : 69 y/o male - appears older than stated age PMHx including HTN, who presents to clinic to establish care, accompanied by daughter at initial visit. Patient underwent TRUS guided prostate biopsy on 2/1/2023 which revealed adenocarcinoma of prostate. He underwent PET/CT PSMA which reveals numerous foci of abnormal accumulation within the osseous structures of head and neck, thorax, abdomen and pelvis with numerous para-aortic and bilateral iliac lymph nodes, multiple osseous lesions in the left acetabulum, pubic rami T3 and 8 vertebral body. Patient was evaluated at [facility] (Dr. Youssef) and recommended to continue with ADT + Xtandi or Zytiga and repeat imaging in 6 months. He was started on casodex and ADT (Eliguard every 3 months) which was initiated in 03/2023. Subsequently, he was started on Xtandi 160mg daily and tolerating fine with some fatigue. He is presently feeling well. Patient denies fever, chills, nausea, vomiting, dyspnea, new bony pain or bleeding.   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, D[place] 4+3 = 7, dx 02/2023 - s/p TRUSP on 2.1.2023 - reviewed radiology, pathology and lab workup and had a discussion regarding implications of diagnosis, prognosis and options for management including but not limited to ADT + Xtandi - STOP Casodex 50mg daily as of 03/2025 - c/w Xtandi 160mg daily as prescribed , Rx renewed - change from Eliguard (previously given with Urologist) to Lupron in [facility] clinic every 3 months starting 07/2025, initiated ADT using Eligard with URO in 03/2023-04/2025 ; possible side effects discussed + orders written - c/w Zometa 4mg IV every 12 weeks; due today 6/12, next due 09/2025  referred for occasional urgency he has been followed and continues to follow with Dr. Lr he is on Myrbetriq - prescribed by Dr. Lr and monitored by him   ml  IPSS 22

## 2025-07-03 NOTE — BEGINNING OF VISIT
[0] : 2) Feeling down, depressed, or hopeless: Not at all (0) [PHQ-2 Negative] : PHQ-2 Negative [[id number]] : 0 [Current] : Current [Date Discussed (MM/DD/YY): ___] : Discussed: [unfilled] [Reviewed, no changes] : Reviewed, no changes

## 2025-07-03 NOTE — BEGINNING OF VISIT
[0] : 2) Feeling down, depressed, or hopeless: Not at all (0) [PHQ-2 Negative] : PHQ-2 Negative [[id number]] : 0 [Current] : Current [Date Discussed (MM/DD/YY): ___] : Discussed: [unfilled] [Reviewed, no changes] : Reviewed, no changes

## 2025-07-03 NOTE — BEGINNING OF VISIT
[0] : 2) Feeling down, depressed, or hopeless: Not at all (0) [PHQ-2 Negative] : PHQ-2 Negative [[id number]] : 0 [Current] : Current [Date Discussed (MM/DD/YY): ___] : Discussed: [unfilled] [Reviewed, no changes] : Reviewed, no changes

## 2025-07-04 NOTE — ASSESSMENT
[Palliative] : Goals of care discussed with patient: Palliative [Palliative Care Plan] : not applicable at this time [With Patient/Caregiver] : With Patient/Caregiver [FreeTextEntry1] : Deny Dasilva presents to the office to establish care.  His prior oncologist has left the practice.  He was initially evaluated for prostate cancer in May 2023.  At presentation, he had multiple areas of bony metastases.  He was [place] at [facility] [place] Arabella Youssef..  She recommended continuing with ADT and Xtandi or Zytiga with repeat imaging in 6 months.  He was initiated on Casodex and ADT in March 2023.  He was subsequently started on Xtandi, the exact date being unclear.  He had Bentley grade group 3 on his biopsy.  He has been getting his Eligard from urology, but he has decided he would like to transition his ADT here for convenience.  He states that he feels "all right".  He continues to smoke and consumes about 2 packs/day, he is down from 3 packs/day.  He is using nicotine patch and gum in the past.  He has been smoking for more than 55 years.  He has COPD with dyspnea on exertion.  He says he cannot walk very far.  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  After 4 hours of work, he developed some back pains and it occurs if he sitting in the car or walking for long period of time.  The pain does not awaken him.  It is an achy sensation located lower back.  No medications are taken.  Has been present for the last 6 months without progression.  Urinary flow is "not good".  It is variable with low flow, urgency.  But there is no incontinence.  He denies any hematuria or dysuria.  Nocturia occurs every 2-3 hours.  He has hot flashes multiple times a day, followed by a cold sensation of his feet.  Fatigue is present, particularly after eating.  He says he has to lie down.  He naps on a daily basis.  He awakens at 2:30 AM to go to work and works until about 1 PM.  He has now transitioned to part-time.  He has some cough with sputum production.  He does not have shortness of breath at rest.  He uses a nebulizer and he has wheezing on occasion.  There is no edema to extremities.  He is lost about 11 pounds from October and he says his appetite is "not bad".  There are no GI complaints.  A comprehensive history was obtained and a physical examination was performed.  Relevant imaging studies were personally reviewed by me.  He denies any difficulty walking but he does feel that his muscles are weaker than before.  He has some numbness of his toes.  There are no headaches.  On physical examination, his performance status is 1.  His blood pressure was 115/76.  His weight was recorded at 53.2 kg.  His weight was 58.3 in October.  The HEENT examination is normal.  The lungs revealed no wheezes rhonchi or rales.  There is fair air entry.  The heart examination is normal.  The abdomen is scaphoid, and is intercostal muscle wasting with sarcopenia of the quadriceps muscles.  The skin is dry with some bruising.  He had laboratory values performed 2 days before the visit.  His white blood cell count was 7.2 with a hemoglobin value of 14.8 g.  The platelet count was 298,000.  The chemistry panel was normal.  Transaminase levels and alkaline phosphatase were normal.  His PSA was 0.08, stable.  For unknown reasons, he was taking Casodex in addition to Xtandi.  In his paper chart, there is an order from March indicating that Casodex should be discontinued, but this was from an ACP that does not work with him.  He also has utilized megestrol acetate.  This agent can stimulate the androgen receptor and is contraindicated in prostate cancer.  He was told to discontinue this as well.  Patients with prostate cancer infrequently have weight loss, particularly with stable disease findings.  He saw a pulmonologist recently and was referred for a chest x-ray.  I think it might be more appropriate to perform a screening low-dose CAT scan of his chest.  If this is negative, then I would recommend that he have further imaging of his abdomen and pelvis to determine if there is some other underlying malignancy.  He does not have any imaging studies in our system.  The only imaging report was an MRI from December 2022 with the time he was initially diagnosed with prostate cancer.  The additional MRI performed at an outside center said that there was a suspicious lesion within the acetabulum.  I do not know if he had additional imaging studies at diagnosis at his [facility] visit, and there is no documentation in the chart of staging.  The bicalutamide was discontinued.  He was told not to take the megestrol.  I will see him once again in 2 months time.  He is due for his Lupron or Eligard injection on September 25.  He also receives every 12-week zoledronic acid.  No DEXA scan is documented. [AdvancecareDate] : 7/3/25

## 2025-07-04 NOTE — PHYSICAL EXAM
[Restricted in physically strenuous activity but ambulatory and able to carry out work of a light or sedentary nature] : Status 1- Restricted in physically strenuous activity but ambulatory and able to carry out work of a light or sedentary nature, e.g., light house work, office work [Normal] : affect appropriate [de-identified] : no wheezes, ronchi, fair air entry [de-identified] : no gynecomastia [de-identified] : scaphoid [de-identified] : sarcopenia [de-identified] : dry, bruises

## 2025-07-04 NOTE — CONSULT LETTER
[Dear  ___] : Dear  [unfilled], [Consult Letter:] : I had the pleasure of evaluating your patient, [unfilled]. [Please see my note below.] : Please see my note below. [Sincerely,] : Sincerely, [FreeTextEntry3] : Stephen Jane NP  Hematology/ Medical Oncology [phone] office

## 2025-07-04 NOTE — ASSESSMENT
[Palliative] : Goals of care discussed with patient: Palliative [Palliative Care Plan] : not applicable at this time [With Patient/Caregiver] : With Patient/Caregiver [FreeTextEntry1] : Deny Dasilva presents to the office to establish care.  His prior oncologist has left the practice.  He was initially evaluated for prostate cancer in May 2023.  At presentation, he had multiple areas of bony metastases.  He was [place] at [facility] [place] Arabella Youssef..  She recommended continuing with ADT and Xtandi or Zytiga with repeat imaging in 6 months.  He was initiated on Casodex and ADT in March 2023.  He was subsequently started on Xtandi, the exact date being unclear.  He had [place] grade group 3 on his biopsy.  He has been getting his Eligard from urology, but he has decided he would like to transition his ADT here for convenience.  He states that he feels "all right".  He continues to smoke and consumes about 2 packs/day, he is down from 3 packs/day.  He is using nicotine patch and gum in the past.  He has been smoking for more than 55 years.  He has COPD with dyspnea on exertion.  He says he cannot walk very far.  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  After 4 hours of work, he developed some back pains and it occurs if he sitting in the car or walking for long period of time.  The pain does not awaken him.  It is an achy sensation located lower back.  No medications are taken.  Has been present for the last 6 months without progression.  Urinary flow is "not good".  It is variable with low flow, urgency.  But there is no incontinence.  He denies any hematuria or dysuria.  Nocturia occurs every 2-3 hours.  He has hot flashes multiple times a day, followed by a cold sensation of his feet.  Fatigue is present, particularly after eating.  He says he has to lie down.  He naps on a daily basis.  He awakens at 2:30 AM to go to work and works until about 1 PM.  He has now transitioned to part-time.  He has some cough with sputum production.  He does not have shortness of breath at rest.  He uses a nebulizer and he has wheezing on occasion.  There is no edema to extremities.  He is lost about 11 pounds from October and he says his appetite is "not bad".  There are no GI complaints.  A comprehensive history was obtained and a physical examination was performed.  Relevant imaging studies were personally reviewed by me.  He denies any difficulty walking but he does feel that his muscles are weaker than before.  He has some numbness of his toes.  There are no headaches.  On physical examination, his performance status is 1.  His blood pressure was 115/76.  His weight was recorded at 53.2 kg.  His weight was 58.3 in October.  The HEENT examination is normal.  The lungs revealed no wheezes rhonchi or rales.  There is fair air entry.  The heart examination is normal.  The abdomen is scaphoid, and is intercostal muscle wasting with sarcopenia of the quadriceps muscles.  The skin is dry with some bruising.  He had laboratory values performed 2 days before the visit.  His white blood cell count was 7.2 with a hemoglobin value of 14.8 g.  The platelet count was 298,000.  The chemistry panel was normal.  Transaminase levels and alkaline phosphatase were normal.  His PSA was 0.08, stable.  For unknown reasons, he was taking Casodex in addition to Xtandi.  In his paper chart, there is an order from March indicating that Casodex should be discontinued, but this was from an ACP that does not work with him.  He also has utilized megestrol acetate.  This agent can stimulate the androgen receptor and is contraindicated in prostate cancer.  He was told to discontinue this as well.  Patients with prostate cancer infrequently have weight loss, particularly with stable disease findings.  He saw a pulmonologist recently and was referred for a chest x-ray.  I think it might be more appropriate to perform a screening low-dose CAT scan of his chest.  If this is negative, then I would recommend that he have further imaging of his abdomen and pelvis to determine if there is some other underlying malignancy.  He does not have any imaging studies in our system.  The only imaging report was an MRI from December 2022 with the time he was initially diagnosed with prostate cancer.  The additional MRI performed at an outside center said that there was a suspicious lesion within the acetabulum.  I do not know if he had additional imaging studies at diagnosis at his [facility] visit, and there is no documentation in the chart of staging.  The bicalutamide was discontinued.  He was told not to take the megestrol.  I will see him once again in 2 months time.  He is due for his Lupron or Eligard injection on September 25.  He also receives every 12-week zoledronic acid.  No DEXA scan is documented. [AdvancecareDate] : 7/3/25

## 2025-07-04 NOTE — HISTORY OF PRESENT ILLNESS
[Disease: _____________________] : Disease: [unfilled] [T: ___] : T[unfilled] [N: ___] : N[unfilled] [M: ___] : M[unfilled] [AJCC Stage: ____] : AJCC Stage: [unfilled] [de-identified] : Mr. Deny Dasilva initially evaluated for prostate cancer in May 2023.  His care is being transitioned at this time as his primary oncologist is leaving.  He underwent a TRUS guided prostate biopsy on 2/1/2023 which revealed adenocarcinoma of prostate.  He underwent PET/CT PSMA which reveals numerous foci of abnormal accumulation within the osseous structures of head and neck, thorax, abdomen and pelvis with numerous para-aortic and bilateral iliac lymph nodes, multiple osseous lesions in the left acetabulum, pubic rami T3 and 8 vertebral body.  Patient was evaluated at [facility] [place] ([place]. Youssef) and recommended to continue with ADT + Xtandi or Zytiga and repeat imaging in 6 months.  On Casodex and ADT (Eligard every 3 months), initiated in 03/2023.  Subsequently, he was started on Xtandi 160mg  RADIOLOGIC WORKUP PET/CT PSMA (3.3.2023) IMPRESSION: Numerous foci of abnormal 18F PSMA accumulation within osseous structures, mediastinum, abdominal lymph nodes, retrocrural lymph node, prostate gland and left lower neck, consistent with metastatic disease. NM Bone Imaging (1.20.2023) Impression: Abnormal bone agent uptake in the region of the right and left posterior acetabulum seen on planar and SPECT imaging which in correlation with CT abdomen and pelvis 12/28/2022 suspicious for metastatic bone disease.Additional focus of abnormal bone agent uptake in the right anterior second rib which is indeterminant. Correlation with CT chest may be obtained CT A/P (12.28.2022) IMPRESSION: A few scattered sclerotic lesions in the pelvis, for example 7 mm sclerotic lesion in the right ischial tuberosity with lucent center (8/303), 1.1 cm targetoid lesion in the right iliac bone (8/204) and 1.1 cm sclerotic lesion in the posterior left acetabulum (8/260). These lesions are nonspecific but worrisome for metastatic bone disease if a prostate carcinoma diagnosis is made. Recommend further evaluation with bone scan. No additional evidence for metastatic disease is identified. MR Prostate (12.1.2022-R) IMPRESSION:  1.  Diffuse prostate cancer throughout the entire peripheral zone with involvement of the left transition zone at the apex in the right transition zone at the base.  Mild bulging of the anatomic capsule of the right peripheral zone at the level of the mid gland suspicious for extracapsular extension.  Key images available.  PI-RADS category 5 very high (clinically significant cancer is highly likely to be present). 2.  Suspicious lesion in the right posterior acetabulum.  Recommend correlation with bone scan.3.  No seminal vesicle invasion or pelvic lymphadenopathy.  LAB WORKUP  (4.20.2023) PSA 5.95  (11.9.2022) PSA 30.70 (7.28.2022) PSA 24.42  PATHOLOGY Final Diagnosis (2.1.2023) Prostatic Adenocarcinoma.  [place]'s Grade 4+3 = 7/10 (see scanned files)   8/11/23 Patient is here for a follow-up visit for Prostate Cancer.  He is feeling well with no new complaints.  He continues on Xtandi 160mg daily.  His last Eligard was administered last week with URO.  Reviewed most recent CBC, which is stable with hgb 14.2g/dL.  PSA level came down to 0.31ng/mL.  He has CT Chest pending since he is a current, every-day smoker.  Encouraged to completely abstain from smoking.  We offered smoking cessation  referral, but patient deferred at this time.  Patient aware to let us know when he is ready to quit and we will be glad to assist. He endorses occasional hot flashes.    11/3/23 Patient is here for a follow-up visit for Prostate Cancer.  He is feeling well with no new complaints.  He continues on Xtandi 160mg daily.  His last Eligard was administered last week with URO.  Reviewed most recent CBC, which is stable with hgb 14g/dL.  PSA level came down to 0.15ng/mL.  He says he had CT Chest performed recently since he is a current, every-day smoker.  He does not have the results for our review.  Encouraged to completely abstain from smoking.    1/26/24 Patient is here for a follow-up visit for Prostate Cancer.  He is feeling well with no new complaints.  He continues on Xtandi 160mg daily.  His last Eligard was administered last week with URO.  Reviewed most recent CBC, which is stable with hgb 13.8g/dL.  PSA level came down to 0.08ng/mL.  Encouraged to completely abstain from smoking.    7/11/24 Patient is here for a follow-up visit for Prostate Cancer.  He is feeling well with no new complaints.  He continues on Xtandi 160mg daily.  His last Eligard was administered last week with URO.  Reviewed most recent CBC, which is stable with hgb 13.3g/dL.  PSA level came down to 0.06ng/mL.  Encouraged to completely abstain from smoking.    10/3/24 Patient is here for a follow-up visit for Prostate Cancer.  He is feeling well with no new complaints.  He continues on Xtandi 160mg daily.  His is due for next Eligard with URO in 2 weeks.  Reviewed most recent CBC, which is stable with hgb 14.5g/dL.  PSA level down to 0.05ng/mL.  Encouraged to completely abstain from smoking.  His potassium is high, but he says he has been having a lot of bananas recently.  He endorses occasional hot flashes and some fatigue.  He denies fevers, chills, chest pain, palpitations, nausea, vomiting, bony pain or hematuria.  He continues to smoke.   3/20/25 Patient is here for a follow-up visit for Prostate Cancer.  He continues on Xtandi 160mg daily.  His is due for next Eligard with URO in 2 weeks.  He was in the hospital at [facility] a few months ago due to upper respiratory infection but feeling better.  Reviewed most recent CBC, which is stable with hgb 13.5g/dL.  PSA level down to 0.05ng/mL.  He denies fevers, chills, chest pain, palpitations, nausea, vomiting, bony pain or hematuria.    6/12/25 Patient is here for a follow-up visit for Prostate Cancer.  He continues on Xtandi 160mg daily.  He last received Eligard with URO back in 04/2025; however, he states he would like to transition to ADT here due to convenience.  He will be due for Lupron starting 07/2025.  Patient reports urinary frequency which is not new but is bothersome; states he urinates only a few drops each time but does not feel like he has urinary retention.  He has not discussed this symptom with URO as he has not been established with another Urologist since Dr. Lr's departure.  Reviewed most recent CBC, which is stable with hgb 15g/dL.  PSA level is at 0.08ng/mL.  He denies fevers, chills, chest pain, palpitations, nausea, vomiting, bony pain or hematuria.  However, he notes dyspnea with minimal exertion, which is also not new.  He states he is a current every-day smoker but trying to cut down.  He does not have an established pulmonologist despite smoking 55PY.   [de-identified] : Urology notes indicate that he had [place] grade group 3 with every core involved. [de-identified] : PSA was 30.8 in November 2022.  In July 2022 was 24.2. [FreeTextEntry1] : ADT (Eligard), initiated in 03/2023. Enzalutamide was added [de-identified] : 7/3/25...presents for first visit with new physician. Feels "all right". Continues to smoke, 2 PPD, trying to quit, down from 3 ppd. Has used nicotine patch and gum. Smoker for 55+ years. Has COPD, has YI, can't walk very far. He continues to work, services grocery stores. After 4 hours of work, he has back pains--occurs if he is in the car or walking for a long period, does not awaken him. Achy sensation lower back. NO meds taken, present for the past 6 months. Urine flow "not good", variable, low flow, urgency, no incontinence. NO blood, no dysuria. Nocturia every 2-3 hours. Hot flushes, multiple times a day, followed by cold sensation of feet. Fatigue is present, particularly after eating, has to lie down, naps daily. Awakens at 2:30 AM, works to 1 PM, has to lie down. Now transitioned to part time. Some cough, some sputum. Has YI, not at rest, uses nebulizer, has wheezing on occasion. NO chest pain/pressure/palpitations. No edema. No HA, dizziness, no balance issues, no falls.  Appetite "not bad". No N/V/D/C. Dropped 11 pounds from October

## 2025-07-04 NOTE — PHYSICAL EXAM
[Restricted in physically strenuous activity but ambulatory and able to carry out work of a light or sedentary nature] : Status 1- Restricted in physically strenuous activity but ambulatory and able to carry out work of a light or sedentary nature, e.g., light house work, office work [Normal] : affect appropriate [de-identified] : no wheezes, ronchi, fair air entry [de-identified] : no gynecomastia [de-identified] : scaphoid [de-identified] : sarcopenia [de-identified] : dry, bruises

## 2025-07-04 NOTE — RESULTS/DATA
[FreeTextEntry1] :   So what actually happens an MRI was performed on December 1, 2022.  The indication was an elevated PSA.  This was done at an outside radiology center.  The prostate measured 4.6 x 3.8 x 4.8 cm, volume 44 cc.  There was diffuse T2 hypointense signal intensity throughout the entire peripheral zone.  There was marked restricted diffusion on DWI.  There was abnormal T2 hypointense signal in the left transition zone at the apex and in the right transition zone at the base, also corresponding with restricted diffusion.  There was mild bulging of the anatomic capsule at the level of the posterior right peripheral zone and at the level of the mid gland.  PI-RADS 5..  No seminal vesicle invasion.  No postbiopsy hemorrhage was visualized.  No pelvic adenopathy was present.  There was a focus of restricted diffusion in the right posterior acetabulum, suspicious.

## 2025-07-04 NOTE — HISTORY OF PRESENT ILLNESS
[Disease: _____________________] : Disease: [unfilled] [T: ___] : T[unfilled] [N: ___] : N[unfilled] [M: ___] : M[unfilled] [AJCC Stage: ____] : AJCC Stage: [unfilled] [de-identified] : Mr. Deny Dasilva initially evaluated for prostate cancer in May 2023.  His care is being transitioned at this time as his primary oncologist is leaving.  He underwent a TRUS guided prostate biopsy on 2/1/2023 which revealed adenocarcinoma of prostate.  He underwent PET/CT PSMA which reveals numerous foci of abnormal accumulation within the osseous structures of head and neck, thorax, abdomen and pelvis with numerous para-aortic and bilateral iliac lymph nodes, multiple osseous lesions in the left acetabulum, pubic rami T3 and 8 vertebral body.  Patient was evaluated at [facility] [place] [place] (Dr. Youssef) and recommended to continue with ADT + Xtandi or Zytiga and repeat imaging in 6 months.  On Casodex and ADT (Eligard every 3 months), initiated in 03/2023.  Subsequently, he was started on Xtandi 160mg  RADIOLOGIC WORKUP PET/CT PSMA (3.3.2023) IMPRESSION: Numerous foci of abnormal 18F PSMA accumulation within osseous structures, mediastinum, abdominal lymph nodes, retrocrural lymph node, prostate gland and left lower neck, consistent with metastatic disease. NM Bone Imaging (1.20.2023) Impression: Abnormal bone agent uptake in the region of the right and left posterior acetabulum seen on planar and SPECT imaging which in correlation with CT abdomen and pelvis 12/28/2022 suspicious for metastatic bone disease.Additional focus of abnormal bone agent uptake in the right anterior second rib which is indeterminant. Correlation with CT chest may be obtained CT A/P (12.28.2022) IMPRESSION: A few scattered sclerotic lesions in the pelvis, for example 7 mm sclerotic lesion in the right ischial tuberosity with lucent center (8/303), 1.1 cm targetoid lesion in the right iliac bone (8/204) and 1.1 cm sclerotic lesion in the posterior left acetabulum (8/260). These lesions are nonspecific but worrisome for metastatic bone disease if a prostate carcinoma diagnosis is made. Recommend further evaluation with bone scan. No additional evidence for metastatic disease is identified. MR Prostate (12.1.2022-R) IMPRESSION:  1.  Diffuse prostate cancer throughout the entire peripheral zone with involvement of the left transition zone at the apex in the right transition zone at the base.  Mild bulging of the anatomic capsule of the right peripheral zone at the level of the mid gland suspicious for extracapsular extension.  Key images available.  PI-RADS category 5 very high (clinically significant cancer is highly likely to be present). 2.  Suspicious lesion in the right posterior acetabulum.  Recommend correlation with bone scan.3.  No seminal vesicle invasion or pelvic lymphadenopathy.  LAB WORKUP  (4.20.2023) PSA 5.95  (11.9.2022) PSA 30.70 (7.28.2022) PSA 24.42  PATHOLOGY Final Diagnosis (2.1.2023) Prostatic Adenocarcinoma.  [place]'s Grade 4+3 = 7/10 (see scanned files)   8/11/23 Patient is here for a follow-up visit for Prostate Cancer.  He is feeling well with no new complaints.  He continues on Xtandi 160mg daily.  His last Eligard was administered last week with URO.  Reviewed most recent CBC, which is stable with hgb 14.2g/dL.  PSA level came down to 0.31ng/mL.  He has CT Chest pending since he is a current, every-day smoker.  Encouraged to completely abstain from smoking.  We offered smoking cessation  referral, but patient deferred at this time.  Patient aware to let us know when he is ready to quit and we will be glad to assist. He endorses occasional hot flashes.    11/3/23 Patient is here for a follow-up visit for Prostate Cancer.  He is feeling well with no new complaints.  He continues on Xtandi 160mg daily.  His last Eligard was administered last week with URO.  Reviewed most recent CBC, which is stable with hgb 14g/dL.  PSA level came down to 0.15ng/mL.  He says he had CT Chest performed recently since he is a current, every-day smoker.  He does not have the results for our review.  Encouraged to completely abstain from smoking.    1/26/24 Patient is here for a follow-up visit for Prostate Cancer.  He is feeling well with no new complaints.  He continues on Xtandi 160mg daily.  His last Eligard was administered last week with URO.  Reviewed most recent CBC, which is stable with hgb 13.8g/dL.  PSA level came down to 0.08ng/mL.  Encouraged to completely abstain from smoking.    7/11/24 Patient is here for a follow-up visit for Prostate Cancer.  He is feeling well with no new complaints.  He continues on Xtandi 160mg daily.  His last Eligard was administered last week with URO.  Reviewed most recent CBC, which is stable with hgb 13.3g/dL.  PSA level came down to 0.06ng/mL.  Encouraged to completely abstain from smoking.    10/3/24 Patient is here for a follow-up visit for Prostate Cancer.  He is feeling well with no new complaints.  He continues on Xtandi 160mg daily.  His is due for next Eligard with URO in 2 weeks.  Reviewed most recent CBC, which is stable with hgb 14.5g/dL.  PSA level down to 0.05ng/mL.  Encouraged to completely abstain from smoking.  His potassium is high, but he says he has been having a lot of bananas recently.  He endorses occasional hot flashes and some fatigue.  He denies fevers, chills, chest pain, palpitations, nausea, vomiting, bony pain or hematuria.  He continues to smoke.   3/20/25 Patient is here for a follow-up visit for Prostate Cancer.  He continues on Xtandi 160mg daily.  His is due for next Eligard with URO in 2 weeks.  He was in the hospital at [facility] a few months ago due to upper respiratory infection but feeling better.  Reviewed most recent CBC, which is stable with hgb 13.5g/dL.  PSA level down to 0.05ng/mL.  He denies fevers, chills, chest pain, palpitations, nausea, vomiting, bony pain or hematuria.    6/12/25 Patient is here for a follow-up visit for Prostate Cancer.  He continues on Xtandi 160mg daily.  He last received Eligard with URO back in 04/2025; however, he states he would like to transition to ADT here due to convenience.  He will be due for Lupron starting 07/2025.  Patient reports urinary frequency which is not new but is bothersome; states he urinates only a few drops each time but does not feel like he has urinary retention.  He has not discussed this symptom with URO as he has not been established with another Urologist since Dr. Lr's departure.  Reviewed most recent CBC, which is stable with hgb 15g/dL.  PSA level is at 0.08ng/mL.  He denies fevers, chills, chest pain, palpitations, nausea, vomiting, bony pain or hematuria.  However, he notes dyspnea with minimal exertion, which is also not new.  He states he is a current every-day smoker but trying to cut down.  He does not have an established pulmonologist despite smoking 55PY.   [de-identified] : Urology notes indicate that he had [place] grade group 3 with every core involved. [de-identified] : PSA was 30.8 in November 2022.  In July 2022 was 24.2. [FreeTextEntry1] : ADT (Eligard), initiated in 03/2023. Enzalutamide was added [de-identified] : 7/3/25...presents for first visit with new physician. Feels "all right". Continues to smoke, 2 PPD, trying to quit, down from 3 ppd. Has used nicotine patch and gum. Smoker for 55+ years. Has COPD, has YI, can't walk very far. He continues to work, services grocery stores. After 4 hours of work, he has back pains--occurs if he is in the car or walking for a long period, does not awaken him. Achy sensation lower back. NO meds taken, present for the past 6 months. Urine flow "not good", variable, low flow, urgency, no incontinence. NO blood, no dysuria. Nocturia every 2-3 hours. Hot flushes, multiple times a day, followed by cold sensation of feet. Fatigue is present, particularly after eating, has to lie down, naps daily. Awakens at 2:30 AM, works to 1 PM, has to lie down. Now transitioned to part time. Some cough, some sputum. Has YI, not at rest, uses nebulizer, has wheezing on occasion. NO chest pain/pressure/palpitations. No edema. No HA, dizziness, no balance issues, no falls.  Appetite "not bad". No N/V/D/C. Dropped 11 pounds from October

## 2025-07-04 NOTE — REVIEW OF SYSTEMS
[Fatigue] : fatigue [Recent Change In Weight] : ~T recent weight change [Wheezing] : wheezing [Cough] : cough [SOB on Exertion] : shortness of breath during exertion [Hot Flashes] : hot flashes [Easy Bruising] : a tendency for easy bruising [Negative] : Gastrointestinal [Muscle Weakness] : muscle weakness [Chills] : no chills [Night Sweats] : no night sweats [Chest Pain] : no chest pain [Palpitations] : no palpitations [Lower Ext Edema] : no lower extremity edema [Shortness Of Breath] : no shortness of breath [Dysuria] : no dysuria [Incontinence] : no incontinence [Joint Pain] : no joint pain [Joint Stiffness] : no joint stiffness [Muscle Pain] : no muscle pain [Skin Rash] : no skin rash [Dizziness] : no dizziness [Difficulty Walking] : no difficulty walking [Anxiety] : no anxiety [Depression] : no depression [FreeTextEntry2] : dropped 11 pounds from October [FreeTextEntry3] : cataracts [FreeTextEntry9] : no cramps [de-identified] : no HA, no numbness except for toes

## 2025-07-04 NOTE — REASON FOR VISIT
H/O tubal ligation  ' 1985  H/O varicose vein stripping  1/2017:  Right Leg  S/p bilateral carpal tunnel release  ' 2004:  Left    ' 2015:  Right  Status post colonoscopy  ' 16   Negative  Status post tubal ligation  ' 85 [Follow-Up Visit] : a follow-up [Spouse] : spouse [FreeTextEntry2] : Prostate Cancer

## 2025-07-04 NOTE — ADDENDUM
[FreeTextEntry1] : I asked him to let me know when the chest x-ray is performed by calling my office.   I asked the  to help him with a DMV form for handicap parking, which was completed by social work and then signed by me.

## 2025-07-04 NOTE — HISTORY OF PRESENT ILLNESS
[Disease: _____________________] : Disease: [unfilled] [T: ___] : T[unfilled] [N: ___] : N[unfilled] [M: ___] : M[unfilled] [AJCC Stage: ____] : AJCC Stage: [unfilled] [de-identified] : Mr. Deny Dasilva initially evaluated for prostate cancer in May 2023.  His care is being transitioned at this time as his primary oncologist is leaving.  He underwent a TRUS guided prostate biopsy on 2/1/2023 which revealed adenocarcinoma of prostate.  He underwent PET/CT PSMA which reveals numerous foci of abnormal accumulation within the osseous structures of head and neck, thorax, abdomen and pelvis with numerous para-aortic and bilateral iliac lymph nodes, multiple osseous lesions in the left acetabulum, pubic rami T3 and 8 vertebral body.  Patient was evaluated at [facility] (Dr. Youssef) and recommended to continue with ADT + Xtandi or Zytiga and repeat imaging in 6 months.  On Casodex and ADT (Eligard every 3 months), initiated in 03/2023.  Subsequently, he was started on Xtandi 160mg  RADIOLOGIC WORKUP PET/CT PSMA (3.3.2023) IMPRESSION: Numerous foci of abnormal 18F PSMA accumulation within osseous structures, mediastinum, abdominal lymph nodes, retrocrural lymph node, prostate gland and left lower neck, consistent with metastatic disease. NM Bone Imaging (1.20.2023) Impression: Abnormal bone agent uptake in the region of the right and left posterior acetabulum seen on planar and SPECT imaging which in correlation with CT abdomen and pelvis 12/28/2022 suspicious for metastatic bone disease.Additional focus of abnormal bone agent uptake in the right anterior second rib which is indeterminant. Correlation with CT chest may be obtained CT A/P (12.28.2022) IMPRESSION: A few scattered sclerotic lesions in the pelvis, for example 7 mm sclerotic lesion in the right ischial tuberosity with lucent center (8/303), 1.1 cm targetoid lesion in the right iliac bone (8/204) and 1.1 cm sclerotic lesion in the posterior left acetabulum (8/260). These lesions are nonspecific but worrisome for metastatic bone disease if a prostate carcinoma diagnosis is made. Recommend further evaluation with bone scan. No additional evidence for metastatic disease is identified. MR Prostate (12.1.2022-R) IMPRESSION:  1.  Diffuse prostate cancer throughout the entire peripheral zone with involvement of the left transition zone at the apex in the right transition zone at the base.  Mild bulging of the anatomic capsule of the right peripheral zone at the level of the mid gland suspicious for extracapsular extension.  Key images available.  PI-RADS category 5 very high (clinically significant cancer is highly likely to be present). 2.  Suspicious lesion in the right posterior acetabulum.  Recommend correlation with bone scan.3.  No seminal vesicle invasion or pelvic lymphadenopathy.  LAB WORKUP  (4.20.2023) PSA 5.95  (11.9.2022) PSA 30.70 (7.28.2022) PSA 24.42  PATHOLOGY Final Diagnosis (2.1.2023) Prostatic Adenocarcinoma.  [place]'s Grade 4+3 = 7/10 (see scanned files)   8/11/23 Patient is here for a follow-up visit for Prostate Cancer.  He is feeling well with no new complaints.  He continues on Xtandi 160mg daily.  His last Eligard was administered last week with URO.  Reviewed most recent CBC, which is stable with hgb 14.2g/dL.  PSA level came down to 0.31ng/mL.  He has CT Chest pending since he is a current, every-day smoker.  Encouraged to completely abstain from smoking.  We offered smoking cessation  referral, but patient deferred at this time.  Patient aware to let us know when he is ready to quit and we will be glad to assist. He endorses occasional hot flashes.    11/3/23 Patient is here for a follow-up visit for Prostate Cancer.  He is feeling well with no new complaints.  He continues on Xtandi 160mg daily.  His last Eligard was administered last week with URO.  Reviewed most recent CBC, which is stable with hgb 14g/dL.  PSA level came down to 0.15ng/mL.  He says he had CT Chest performed recently since he is a current, every-day smoker.  He does not have the results for our review.  Encouraged to completely abstain from smoking.    1/26/24 Patient is here for a follow-up visit for Prostate Cancer.  He is feeling well with no new complaints.  He continues on Xtandi 160mg daily.  His last Eligard was administered last week with URO.  Reviewed most recent CBC, which is stable with hgb 13.8g/dL.  PSA level came down to 0.08ng/mL.  Encouraged to completely abstain from smoking.    7/11/24 Patient is here for a follow-up visit for Prostate Cancer.  He is feeling well with no new complaints.  He continues on Xtandi 160mg daily.  His last Eligard was administered last week with URO.  Reviewed most recent CBC, which is stable with hgb 13.3g/dL.  PSA level came down to 0.06ng/mL.  Encouraged to completely abstain from smoking.    10/3/24 Patient is here for a follow-up visit for Prostate Cancer.  He is feeling well with no new complaints.  He continues on Xtandi 160mg daily.  His is due for next Eligard with URO in 2 weeks.  Reviewed most recent CBC, which is stable with hgb 14.5g/dL.  PSA level down to 0.05ng/mL.  Encouraged to completely abstain from smoking.  His potassium is high, but he says he has been having a lot of bananas recently.  He endorses occasional hot flashes and some fatigue.  He denies fevers, chills, chest pain, palpitations, nausea, vomiting, bony pain or hematuria.  He continues to smoke.   3/20/25 Patient is here for a follow-up visit for Prostate Cancer.  He continues on Xtandi 160mg daily.  His is due for next Eligard with URO in 2 weeks.  He was in the hospital at [facility] a few months ago due to upper respiratory infection but feeling better.  Reviewed most recent CBC, which is stable with hgb 13.5g/dL.  PSA level down to 0.05ng/mL.  He denies fevers, chills, chest pain, palpitations, nausea, vomiting, bony pain or hematuria.    6/12/25 Patient is here for a follow-up visit for Prostate Cancer.  He continues on Xtandi 160mg daily.  He last received Eligard with URO back in 04/2025; however, he states he would like to transition to ADT here due to convenience.  He will be due for Lupron starting 07/2025.  Patient reports urinary frequency which is not new but is bothersome; states he urinates only a few drops each time but does not feel like he has urinary retention.  He has not discussed this symptom with URO as he has not been established with another Urologist since Dr. Lr's departure.  Reviewed most recent CBC, which is stable with hgb 15g/dL.  PSA level is at 0.08ng/mL.  He denies fevers, chills, chest pain, palpitations, nausea, vomiting, bony pain or hematuria.  However, he notes dyspnea with minimal exertion, which is also not new.  He states he is a current every-day smoker but trying to cut down.  He does not have an established pulmonologist despite smoking 55PY.   [de-identified] : Urology notes indicate that he had [place] grade group 3 with every core involved. [de-identified] : PSA was 30.8 in November 2022.  In July 2022 was 24.2. [FreeTextEntry1] : ADT (Eligard), initiated in 03/2023. Enzalutamide was added [de-identified] : 7/3/25...presents for first visit with new physician. Feels "all right". Continues to smoke, 2 PPD, trying to quit, down from 3 ppd. Has used nicotine patch and gum. Smoker for 55+ years. Has COPD, has YI, can't walk very far. He continues to work, services grocery stores. After 4 hours of work, he has back pains--occurs if he is in the car or walking for a long period, does not awaken him. Achy sensation lower back. NO meds taken, present for the past 6 months. Urine flow "not good", variable, low flow, urgency, no incontinence. NO blood, no dysuria. Nocturia every 2-3 hours. Hot flushes, multiple times a day, followed by cold sensation of feet. Fatigue is present, particularly after eating, has to lie down, naps daily. Awakens at 2:30 AM, works to 1 PM, has to lie down. Now transitioned to part time. Some cough, some sputum. Has YI, not at rest, uses nebulizer, has wheezing on occasion. NO chest pain/pressure/palpitations. No edema. No HA, dizziness, no balance issues, no falls.  Appetite "not bad". No N/V/D/C. Dropped 11 pounds from October

## 2025-07-04 NOTE — PHYSICAL EXAM
[Restricted in physically strenuous activity but ambulatory and able to carry out work of a light or sedentary nature] : Status 1- Restricted in physically strenuous activity but ambulatory and able to carry out work of a light or sedentary nature, e.g., light house work, office work [Normal] : affect appropriate [de-identified] : no wheezes, ronchi, fair air entry [de-identified] : no gynecomastia [de-identified] : scaphoid [de-identified] : sarcopenia [de-identified] : dry, bruises

## 2025-07-04 NOTE — REVIEW OF SYSTEMS
[Fatigue] : fatigue [Recent Change In Weight] : ~T recent weight change [Wheezing] : wheezing [Cough] : cough [SOB on Exertion] : shortness of breath during exertion [Hot Flashes] : hot flashes [Easy Bruising] : a tendency for easy bruising [Negative] : Gastrointestinal [Muscle Weakness] : muscle weakness [Chills] : no chills [Night Sweats] : no night sweats [Chest Pain] : no chest pain [Palpitations] : no palpitations [Lower Ext Edema] : no lower extremity edema [Shortness Of Breath] : no shortness of breath [Dysuria] : no dysuria [Incontinence] : no incontinence [Joint Pain] : no joint pain [Joint Stiffness] : no joint stiffness [Muscle Pain] : no muscle pain [Skin Rash] : no skin rash [Dizziness] : no dizziness [Difficulty Walking] : no difficulty walking [Anxiety] : no anxiety [Depression] : no depression [FreeTextEntry2] : dropped 11 pounds from October [FreeTextEntry3] : cataracts [FreeTextEntry9] : no cramps [de-identified] : no HA, no numbness except for toes

## 2025-07-04 NOTE — REVIEW OF SYSTEMS
[Fatigue] : fatigue [Recent Change In Weight] : ~T recent weight change [Wheezing] : wheezing [Cough] : cough [SOB on Exertion] : shortness of breath during exertion [Hot Flashes] : hot flashes [Easy Bruising] : a tendency for easy bruising [Negative] : Gastrointestinal [Muscle Weakness] : muscle weakness [Chills] : no chills [Night Sweats] : no night sweats [Chest Pain] : no chest pain [Palpitations] : no palpitations [Lower Ext Edema] : no lower extremity edema [Shortness Of Breath] : no shortness of breath [Dysuria] : no dysuria [Incontinence] : no incontinence [Joint Pain] : no joint pain [Joint Stiffness] : no joint stiffness [Muscle Pain] : no muscle pain [Skin Rash] : no skin rash [Dizziness] : no dizziness [Difficulty Walking] : no difficulty walking [Anxiety] : no anxiety [Depression] : no depression [FreeTextEntry2] : dropped 11 pounds from October [FreeTextEntry3] : cataracts [FreeTextEntry9] : no cramps [de-identified] : no HA, no numbness except for toes

## 2025-07-21 NOTE — REVIEW OF SYSTEMS
[Recent Wt Loss (___ Lbs)] : ~T recent [unfilled] lb weight loss [Cough] : cough [Dyspnea] : dyspnea [SOB on Exertion] : sob on exertion [Negative] : Endocrine [Chest Tightness] : no chest tightness [Sputum] : no sputum [Wheezing] : no wheezing [TextBox_3] : additional 11 pounds, cachectic appearing

## 2025-07-21 NOTE — HISTORY OF PRESENT ILLNESS
[TextBox_4] : 7/21/25: Patient recently quit his job yesterday unable to work secondary to exhaustion, patient continues to smoke 1.5-2ppd, is using nicotine gum as well, patient states is he compliant with his Trelegy, however still reached for his albuterol 8 times a day. SP PFT and CXR revealing of questionable right basilar opacity/effusion patient denies any recent illnesses/URI symptoms.   6/16/25: 69 yo M with PMHx of HTN, PMD Dr. Armstrong, Metastatic Prostate Cancer on ADT diagnosed 2023 followed by Dr. Atwood at [facility] and [facility] (Dr. Youssef) Patient endorses SOB with minimal exertion for "many years" with worsening progression in the past "few months" was seen at [facility] 6 months ago for SOB was told his cxr was clear, discharged with duonebs and nebulizer machine which he uses daily, with each use he cough afterwards with productive clear phelgm, patient also reaches for his albuterol inhaler every hour. Patient able to ambulate only on flat surface.   Active smoker 1.5-2ppd for over 55 years Retired  Lives with wife

## 2025-07-21 NOTE — ASSESSMENT
[FreeTextEntry1] : Severe COPD not in active exacerbation  Questionable right basilar opacity/effusion Active smoker

## 2025-07-21 NOTE — PHYSICAL EXAM
[No Acute Distress] : no acute distress [Normal Oropharynx] : normal oropharynx [Normal Appearance] : normal appearance [No Neck Mass] : no neck mass [Normal Rate/Rhythm] : normal rate/rhythm [Normal S1, S2] : normal s1, s2 [No Murmurs] : no murmurs [No Resp Distress] : no resp distress [Clear to Auscultation Bilaterally] : clear to auscultation bilaterally [No Abnormalities] : no abnormalities [Benign] : benign [Normal Gait] : normal gait [No Clubbing] : no clubbing [No Cyanosis] : no cyanosis [No Edema] : no edema [FROM] : FROM [Normal Color/ Pigmentation] : normal color/ pigmentation [No Focal Deficits] : no focal deficits [Oriented x3] : oriented x3 [Normal Affect] : normal affect [TextBox_2] : frail, cachectic  [TextBox_68] : decreased bilateral air entry no wheezing

## 2025-07-21 NOTE — REASON FOR VISIT
[Cough] : cough [Shortness of Breath] : shortness of breath [Other: _____] : [unfilled] [Follow-Up] : a follow-up visit

## 2025-07-21 NOTE — COUNSELING
[Cessation strategies including cessation program discussed] : Cessation strategies including cessation program discussed [Use of nicotine replacement therapies and other medications discussed] : Use of nicotine replacement therapies and other medications discussed [Encouraged to pick a quit date and identify support needed to quit] : Encouraged to pick a quit date and identify support needed to quit [Smoking Cessation Program Referral] : Smoking Cessation Program Referral  [Yes] : Willing to quit smoking [FreeTextEntry2] : has called 311, has nictoine patch and gum continues to smoke [FreeTextEntry1] : 10